# Patient Record
Sex: MALE | Race: WHITE | NOT HISPANIC OR LATINO | Employment: STUDENT | URBAN - METROPOLITAN AREA
[De-identification: names, ages, dates, MRNs, and addresses within clinical notes are randomized per-mention and may not be internally consistent; named-entity substitution may affect disease eponyms.]

---

## 2017-09-29 ENCOUNTER — APPOINTMENT (EMERGENCY)
Dept: RADIOLOGY | Facility: HOSPITAL | Age: 22
End: 2017-09-29
Payer: COMMERCIAL

## 2017-09-29 ENCOUNTER — HOSPITAL ENCOUNTER (EMERGENCY)
Facility: HOSPITAL | Age: 22
Discharge: HOME | End: 2017-09-29
Attending: EMERGENCY MEDICINE | Admitting: EMERGENCY MEDICINE
Payer: COMMERCIAL

## 2017-09-29 VITALS
TEMPERATURE: 98.2 F | OXYGEN SATURATION: 97 % | DIASTOLIC BLOOD PRESSURE: 66 MMHG | HEART RATE: 92 BPM | WEIGHT: 150 LBS | SYSTOLIC BLOOD PRESSURE: 153 MMHG | RESPIRATION RATE: 16 BRPM | BODY MASS INDEX: 22.73 KG/M2 | HEIGHT: 68 IN

## 2017-09-29 DIAGNOSIS — H20.9 TRAUMATIC IRITIS: ICD-10-CM
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
## 2017-09-29 PROCEDURE — 70486 CT MAXILLOFACIAL W/O DYE: CPT

## 2017-09-29 PROCEDURE — 70450 CT HEAD/BRAIN W/O DYE: CPT

## 2017-09-29 PROCEDURE — 99284 EMERGENCY DEPT VISIT MOD MDM: CPT

## 2017-09-29 PROCEDURE — 72125 CT NECK SPINE W/O DYE: CPT

## 2017-09-29 PROCEDURE — 96374 THER/PROPH/DIAG INJ IV PUSH: CPT

## 2017-09-29 RX ORDER — MORPHINE SULFATE 10 MG/ML
6 INJECTION, SOLUTION INTRAMUSCULAR; INTRAVENOUS ONCE
Status: COMPLETED | OUTPATIENT
Start: 2017-09-29 | End: 2017-09-29

## 2017-09-29 RX ORDER — ACETAMINOPHEN 325 MG/1
650 TABLET ORAL ONCE
Status: DISCONTINUED | OUTPATIENT
Start: 2017-09-29 | End: 2017-09-29 | Stop reason: HOSPADM

## 2017-09-29 RX ORDER — CYCLOPENTOLATE HYDROCHLORIDE 10 MG/ML
2 SOLUTION/ DROPS OPHTHALMIC ONCE
Status: COMPLETED | OUTPATIENT
Start: 2017-09-29 | End: 2017-09-29

## 2017-09-29 RX ADMIN — CYCLOPENTOLATE HYDROCHLORIDE 2 DROP: 10 SOLUTION/ DROPS OPHTHALMIC at 14:58

## 2017-09-29 RX ADMIN — MORPHINE SULFATE 6 MG: 10 INJECTION, SOLUTION INTRAMUSCULAR; INTRAVENOUS at 14:58
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
## 2017-09-29 NOTE — DISCHARGE INSTRUCTIONS
Continue to take NSAIDs (Tylenol, Advil, Motrin)  as prescribed for pain  Make an appointment with opthalmology as soon as possible for follow up  Iritis   WHAT YOU NEED TO KNOW:   Iritis is inflammation of your iris  The iris is the colored part of your eye  DISCHARGE INSTRUCTIONS:   Follow up with your healthcare provider or ophthalmologist within 24 hours:  Write down your questions so you remember to ask them during your visits  Medicines:   · Cycloplegics: These eyedrops dilate your pupil and relax your eye muscles  This helps decrease pain and light sensitivity  · Steroids: These eyedrops help decrease pain and inflammation  · Acetaminophen: This medicine decreases pain  You can buy acetaminophen without a doctor's order  Ask how much to take and how often to take it  Follow directions  Acetaminophen can cause liver damage if not taken correctly  · Take your medicine as directed  Contact your healthcare provider if you think your medicine is not helping or if you have side effects  Tell him of her if you are allergic to any medicine  Keep a list of the medicines, vitamins, and herbs you take  Include the amounts, and when and why you take them  Bring the list or the pill bottles to follow-up visits  Carry your medicine list with you in case of an emergency  Manage your symptoms:   · Apply a warm compress to your eye:  Wet a washcloth in warm water and wring it out  Place it gently over your eye for 20 minutes 3 to 4 times each day  This will help soothe your eye and decrease inflammation  · Wear dark sunglasses: This will help prevent pain and light sensitivity  Contact your healthcare provider or ophthalmologist if:   · Your pain gets worse, even after treatment  · You see halos or rainbows around lights  · You have questions or concerns about your condition or care  Return to the emergency department if:   · You have severe eye pain and a headache       · Your vision suddenly gets worse  · You have nausea or vomiting  © 2017 [employer] Information is for End User's use only and may not be sold, redistributed or otherwise used for commercial purposes  All illustrations and images included in CareNotes® are the copyrighted property of [employer]  or [employer]  The above information is an  only  It is not intended as medical advice for individual conditions or treatments  Talk to your doctor, nurse or pharmacist before following any medical regimen to see if it is safe and effective for you